# Patient Record
Sex: FEMALE | Race: WHITE
[De-identification: names, ages, dates, MRNs, and addresses within clinical notes are randomized per-mention and may not be internally consistent; named-entity substitution may affect disease eponyms.]

---

## 2021-07-05 ENCOUNTER — HOSPITAL ENCOUNTER (EMERGENCY)
Dept: HOSPITAL 43 - DL.ED | Age: 26
Discharge: HOME | End: 2021-07-05
Payer: COMMERCIAL

## 2021-07-05 DIAGNOSIS — Z79.899: ICD-10-CM

## 2021-07-05 DIAGNOSIS — E03.9: ICD-10-CM

## 2021-07-05 DIAGNOSIS — O03.9: Primary | ICD-10-CM

## 2021-07-05 LAB
ANION GAP SERPL CALC-SCNC: 12.9 MEQ/L (ref 7–13)
CHLORIDE SERPL-SCNC: 105 MMOL/L (ref 98–107)
SODIUM SERPL-SCNC: 142 MMOL/L (ref 136–145)

## 2021-07-05 NOTE — EDM.PDOC
Scribed by Carina Mcdonald 07/05/21 9618 for Mariia Figueroa MD





ED HPI GENERAL MEDICAL PROBLEM





- General


Chief Complaint: OB/GYN Problem


Stated Complaint: 5 WKS PREG,VAGINAL BLEEDING,CRAMPS,BLOOD CLOTS


Time Seen by Provider: 07/05/21 13:01


Source of Information: Reports: Patient, RN, RN Notes Reviewed


History Limitations: Reports: No Limitations





- History of Present Illness


INITIAL COMMENTS - FREE TEXT/NARRATIVE: 


Patient presents to ED with complaints of vaginal bleeding. She is currently 

breast feeding but has been sexually active and did have a positive pregnancy 

test last week. HCG quants last week was 2,000, so they were estimating she was 

about 4-5 weeks along. She is scheduled to have HCG drawn tomorrow, however, she

started having vaginal bleeding. This morning she got cramps and passed a clot 

about2 inches. She has had normal period-like bleeding since bright red in 

nature. Cramping is mild at this point. Patient's last pregnancy was complicated

by a heterotopic pregnancy which resolved on its own without complications. 


Onset: Today


Duration: Constant


Severity: Moderate


Improves with: Reports: None


Worsens with: Reports: None


Associated Symptoms: Reports: No Other Symptoms





- Related Data


Home Meds: 


                                    Home Meds





Levothyroxine 75 mcg PO DAILY 07/05/21 [History]


Magnesium 500 mg PO DAILY 07/05/21 [History]


Prenatal No122/Iron/Folic Acid [Prenatal Multi Tablet] 1 tab PO DAILY 07/05/21 

[History]


Vit D3/Vit K2/Calc Frutoborate [Move Free Ultra-Borate-K2-D3] 50 mcg PO DAILY 

07/05/21 [History]











Past Medical History


HEENT History: Reports: None


Cardiovascular History: Reports: None


Respiratory History: Reports: None


Gastrointestinal History: Reports: None


Genitourinary History: Reports: None


OB/GYN History: Reports: None


Musculoskeletal History: Reports: None


Neurological History: Reports: Migraines


Psychiatric History: Reports: None


Endocrine/Metabolic History: Reports: Hypothyroidism


Hematologic History: Reports: None


Immunologic History: Reports: None


Oncologic (Cancer) History: Reports: None


Dermatologic History: Reports: None





- Infectious Disease History


Infectious Disease History: Reports: None





- Past Surgical History


Head Surgeries/Procedures: Reports: None





Social & Family History





- Family History


Family Medical History: No Pertinent Family History





- Tobacco Use


Tobacco Use Status *Q: Never Tobacco User


Second Hand Smoke Exposure: No





- Caffeine Use


Caffeine Use: Reports: None





- Recreational Drug Use


Recreational Drug Use: No





ED ROS GENERAL





- Review of Systems


Review Of Systems: Comprehensive ROS is negative, except as noted in HPI.





ED EXAM PREGNANCY





- Physical Exam


Exam: See Below


Exam Limited By: No Limitations


General Appearance: Alert, WD/WN, No Apparent Distress


Eye Exam: Bilateral Eye: EOMI, Normal Inspection, PERRL


Ears: Normal External Exam, Normal Canal, Hearing Grossly Normal, Normal TMs


Nose: Normal Inspection, Normal Mucosa, No Blood


Throat/Mouth: Normal Inspection, Normal Lips, Normal Teeth, Normal Gums, Normal 

Oropharynx, Normal Voice, No Airway Compromise


Head: Atraumatic, Normocephalic


Neck: Normal Inspection, Supple, Non-Tender, Full Range of Motion


Respiratory/Chest: No Respiratory Distress, Lungs Clear, Normal Breath Sounds, 

No Accessory Muscle Use, Chest Non-Tender


Cardiovascular: Normal Peripheral Pulses, Regular Rate, Rhythm, No Edema, No 

Gallop, No JVD, No Murmur, No Rub


GI/Abdominal Exam: Normal Bowel Sounds, Soft, Non-Tender, No Organomegaly, No 

Distention, No Abnormal Bruit, No Mass, Pelvis Stable


Rectal Exam: Normal Exam, Normal Rectal Tone


Back Exam: Normal Inspection, Full Range of Motion, NT


Extremities: Normal Inspection, Normal Range of Motion, Non-Tender, Normal 

Capillary Refill, No Pedal Edema


Neurological: Alert, Oriented, CN II-XII Intact, Normal Cognition, Normal Gait, 

Normal Reflexes, No Motor/Sensory Deficits


Psychiatric: Normal Affect, Normal Mood


Skin Exam: Warm, Dry, Intact, Normal Color, No Rash


Lymphatic: No Adenopathy





Course





- Vital Signs


Last Recorded V/S: 


                                Last Vital Signs











Temp  98.9 F   07/05/21 11:10


 


Pulse  77   07/05/21 11:10


 


Resp  18   07/05/21 11:10


 


BP  121/69   07/05/21 11:10


 


Pulse Ox  100   07/05/21 11:10














- Orders/Labs/Meds


Orders: 


                               Active Orders 24 hr











 Category Date Time Status


 


 ABO/RH TYPE [BBK] Stat Lab  07/05/21 11:19 Results











Labs: 


                                Laboratory Tests











  07/05/21 07/05/21 07/05/21 Range/Units





  11:19 11:19 11:19 


 


WBC  5.1    (5.0-10.0)  10^3/uL


 


RBC  4.86    (4.2-5.4)  10^6/uL


 


Hgb  14.7    (12.0-16.0)  g/dL


 


Hct  43.1    (37.0-47.0)  %


 


MCV  88.7    ()  fL


 


MCH  30.2    (27.0-34.0)  pg


 


MCHC  34.1    (33.0-35.0)  g/dL


 


Plt Count  178    (150-450)  10^3/uL


 


Sodium   142   (136-145)  mmol/L


 


Potassium   4.9   (3.5-5.1)  mmol/L


 


Chloride   105   ()  mmol/L


 


Carbon Dioxide   29   (21-32)  mmol/L


 


Anion Gap   12.9   (7-13)  mEq/L


 


BUN   14   (7-18)  mg/dL


 


Creatinine   0.95   (0.55-1.02)  mg/dL


 


Est Cr Clr Drug Dosing   TNP   


 


Estimated GFR (MDRD)   > 60   


 


BUN/Creatinine Ratio   14.7   (No establ ref range)  


 


Glucose   79   (70-99)  mg/dL


 


Calcium   8.6   (8.5-10.1)  mg/dL


 


Total Bilirubin   0.7   (0.2-1.0)  mg/dL


 


AST   15   (15-37)  U/L


 


ALT   25   (14-59)  U/L


 


Alkaline Phosphatase   53   ()  U/L


 


Total Protein   7.0   (6.4-8.2)  g/dL


 


Albumin   3.7   (3.4-5.0)  g/dL


 


Globulin   3.3   


 


Albumin/Globulin Ratio   1.1   


 


HCG, Quant     (0-6)  mIU/mL


 


Urine Color     (YELLOW)  


 


Urine Appearance     (CLEAR)  


 


Urine pH     (5.0-9.0)  


 


Ur Specific Gravity     (1.005-1.030)  


 


Urine Protein     (NEGATIVE)  


 


Urine Glucose (UA)     (NEGATIVE)  


 


Urine Ketones     (NEGATIVE)  


 


Urine Occult Blood     (NEGATIVE)  


 


Urine Nitrite     (NEGATIVE)  


 


Urine Bilirubin     (NEGATIVE)  


 


Urine Urobilinogen     (0.2-1.0)  mg/dL


 


Ur Leukocyte Esterase     (NEGATIVE)  


 


Urine RBC     /HPF


 


Urine WBC     (0-5/HPF)  /HPF


 


Ur Epithelial Cells     (NOT SEEN)  /HPF


 


Urine Bacteria     (0-FEW/HPF)  /HPF


 


Urine Mucus     (NOT SEEN)  /LPF


 


Blood Type    A POSITIVE  














  07/05/21 07/05/21 Range/Units





  11:19 11:29 


 


WBC    (5.0-10.0)  10^3/uL


 


RBC    (4.2-5.4)  10^6/uL


 


Hgb    (12.0-16.0)  g/dL


 


Hct    (37.0-47.0)  %


 


MCV    ()  fL


 


MCH    (27.0-34.0)  pg


 


MCHC    (33.0-35.0)  g/dL


 


Plt Count    (150-450)  10^3/uL


 


Sodium    (136-145)  mmol/L


 


Potassium    (3.5-5.1)  mmol/L


 


Chloride    ()  mmol/L


 


Carbon Dioxide    (21-32)  mmol/L


 


Anion Gap    (7-13)  mEq/L


 


BUN    (7-18)  mg/dL


 


Creatinine    (0.55-1.02)  mg/dL


 


Est Cr Clr Drug Dosing    


 


Estimated GFR (MDRD)    


 


BUN/Creatinine Ratio    (No establ ref range)  


 


Glucose    (70-99)  mg/dL


 


Calcium    (8.5-10.1)  mg/dL


 


Total Bilirubin    (0.2-1.0)  mg/dL


 


AST    (15-37)  U/L


 


ALT    (14-59)  U/L


 


Alkaline Phosphatase    ()  U/L


 


Total Protein    (6.4-8.2)  g/dL


 


Albumin    (3.4-5.0)  g/dL


 


Globulin    


 


Albumin/Globulin Ratio    


 


HCG, Quant  1733 H   (0-6)  mIU/mL


 


Urine Color   Dark yellow  (YELLOW)  


 


Urine Appearance   Clear  (CLEAR)  


 


Urine pH   6.5  (5.0-9.0)  


 


Ur Specific Gravity   >= 1.030  (1.005-1.030)  


 


Urine Protein   Negative  (NEGATIVE)  


 


Urine Glucose (UA)   Negative  (NEGATIVE)  


 


Urine Ketones   Negative  (NEGATIVE)  


 


Urine Occult Blood   Trace-intact H  (NEGATIVE)  


 


Urine Nitrite   Negative  (NEGATIVE)  


 


Urine Bilirubin   Negative  (NEGATIVE)  


 


Urine Urobilinogen   0.2  (0.2-1.0)  mg/dL


 


Ur Leukocyte Esterase   Negative  (NEGATIVE)  


 


Urine RBC   0-5  /HPF


 


Urine WBC   Not seen  (0-5/HPF)  /HPF


 


Ur Epithelial Cells   Rare  (NOT SEEN)  /HPF


 


Urine Bacteria   Not seen  (0-FEW/HPF)  /HPF


 


Urine Mucus   Few H  (NOT SEEN)  /LPF


 


Blood Type    














Departure





- Departure


Time of Disposition: 14:05


Disposition: Home, Self-Care 01


Condition: Good


Clinical Impression: 


 Miscarriage








- Discharge Information


*PRESCRIPTION DRUG MONITORING PROGRAM REVIEWED*: Not Applicable


*COPY OF PRESCRIPTION DRUG MONITORING REPORT IN PATIENT BO: Not Applicable


Instructions:  Miscarriage, Easy-to-Read


Forms:  ED Department Discharge


Additional Instructions: 


Tylenol and Ibuprofen as needed for discomfort. 


If cramping and/or bleeding  becomes more severe or if starts having 

lightheadedness or dizziness, she can to ER.


She is follow up with her primary physician or OB-GYN next week 





Sepsis Event Note (ED)





- Evaluation


Sepsis Screening Result: No Definite Risk





- Focused Exam


Vital Signs: 


                                   Vital Signs











  Temp Pulse Resp BP Pulse Ox


 


 07/05/21 11:10  98.9 F  77  18  121/69  100














- My Orders


Last 24 Hours: 


My Active Orders





07/05/21 11:19


ABO/RH TYPE [BBK] Stat 














- Assessment/Plan


Last 24 Hours: 


My Active Orders





07/05/21 11:19


ABO/RH TYPE [BBK] Stat 














I have read and agree with the documentation that has been completed regarding 

this visit. By signing this record, I attest that the documentation was 

completed in my physical presence and is an accurate record of the encounter.